# Patient Record
Sex: MALE | Race: WHITE | Employment: FULL TIME | URBAN - METROPOLITAN AREA
[De-identification: names, ages, dates, MRNs, and addresses within clinical notes are randomized per-mention and may not be internally consistent; named-entity substitution may affect disease eponyms.]

---

## 2022-09-29 ENCOUNTER — OFFICE VISIT (OUTPATIENT)
Dept: OCCUPATIONAL MEDICINE | Age: 22
End: 2022-09-29
Attending: FAMILY MEDICINE

## 2022-09-29 DIAGNOSIS — Z00.00 ROUTINE GENERAL MEDICAL EXAMINATION AT A HEALTH CARE FACILITY: Primary | ICD-10-CM

## 2022-09-29 PROCEDURE — 86480 TB TEST CELL IMMUN MEASURE: CPT

## 2022-10-03 LAB
M TB IFN-G CD4+ T-CELLS BLD-ACNC: 0.01 IU/ML
M TB TUBERC IFN-G BLD QL: NEGATIVE
M TB TUBERC IGNF/MITOGEN IGNF CONTROL: >10 IU/ML
QFT TB1 AG MINUS NIL: 0 IU/ML
QFT TB2 AG MINUS NIL: 0 IU/ML

## 2024-04-24 ENCOUNTER — OFFICE VISIT (OUTPATIENT)
Dept: OTHER | Facility: HOSPITAL | Age: 24
End: 2024-04-24
Attending: EMERGENCY MEDICINE
Payer: COMMERCIAL

## 2024-04-24 DIAGNOSIS — Z02.1 PHYSICAL EXAM, PRE-EMPLOYMENT: Primary | ICD-10-CM

## 2024-04-24 DIAGNOSIS — Z00.00 ROUTINE GENERAL MEDICAL EXAMINATION AT A HEALTH CARE FACILITY: Primary | ICD-10-CM

## 2024-04-24 LAB
ALBUMIN SERPL-MCNC: 5 G/DL (ref 3.2–4.8)
ALBUMIN/GLOB SERPL: 1.6 {RATIO} (ref 1–2)
ALP LIVER SERPL-CCNC: 63 U/L
ALT SERPL-CCNC: 16 U/L
ANION GAP SERPL CALC-SCNC: 8 MMOL/L (ref 0–18)
AST SERPL-CCNC: 24 U/L (ref ?–34)
ATRIAL RATE: 46 BPM
BASOPHILS # BLD AUTO: 0.03 X10(3) UL (ref 0–0.2)
BASOPHILS NFR BLD AUTO: 0.4 %
BILIRUB SERPL-MCNC: 0.9 MG/DL (ref 0.3–1.2)
BILIRUB UR QL: NEGATIVE
BUN BLD-MCNC: 17 MG/DL (ref 9–23)
BUN/CREAT SERPL: 15.3 (ref 10–20)
CALCIUM BLD-MCNC: 10.2 MG/DL (ref 8.7–10.4)
CHLORIDE SERPL-SCNC: 104 MMOL/L (ref 98–112)
CHOLEST SERPL-MCNC: 277 MG/DL (ref ?–200)
CLARITY UR: CLEAR
CO2 SERPL-SCNC: 26 MMOL/L (ref 21–32)
COLOR UR: YELLOW
CREAT BLD-MCNC: 1.11 MG/DL
DEPRECATED RDW RBC AUTO: 38 FL (ref 35.1–46.3)
EGFRCR SERPLBLD CKD-EPI 2021: 96 ML/MIN/1.73M2 (ref 60–?)
EOSINOPHIL # BLD AUTO: 0.08 X10(3) UL (ref 0–0.7)
EOSINOPHIL NFR BLD AUTO: 1.1 %
ERYTHROCYTE [DISTWIDTH] IN BLOOD BY AUTOMATED COUNT: 12.4 % (ref 11–15)
FASTING PATIENT LIPID ANSWER: YES
FASTING STATUS PATIENT QL REPORTED: YES
GLOBULIN PLAS-MCNC: 3.2 G/DL (ref 2.8–4.4)
GLUCOSE BLD-MCNC: 84 MG/DL (ref 70–99)
GLUCOSE UR-MCNC: NORMAL MG/DL
HBV SURFACE AB SER QL: REACTIVE
HBV SURFACE AB SERPL IA-ACNC: 24.71 MIU/ML
HCT VFR BLD AUTO: 47.9 %
HDLC SERPL-MCNC: 47 MG/DL (ref 40–59)
HGB BLD-MCNC: 16.8 G/DL
HGB UR QL STRIP.AUTO: NEGATIVE
IMM GRANULOCYTES # BLD AUTO: 0.03 X10(3) UL (ref 0–1)
IMM GRANULOCYTES NFR BLD: 0.4 %
KETONES UR-MCNC: 20 MG/DL
LDLC SERPL CALC-MCNC: 203 MG/DL (ref ?–100)
LEUKOCYTE ESTERASE UR QL STRIP.AUTO: NEGATIVE
LYMPHOCYTES # BLD AUTO: 2.72 X10(3) UL (ref 1–4)
LYMPHOCYTES NFR BLD AUTO: 38.5 %
MCH RBC QN AUTO: 29.5 PG (ref 26–34)
MCHC RBC AUTO-ENTMCNC: 35.1 G/DL (ref 31–37)
MCV RBC AUTO: 84 FL
MONOCYTES # BLD AUTO: 0.64 X10(3) UL (ref 0.1–1)
MONOCYTES NFR BLD AUTO: 9.1 %
NEUTROPHILS # BLD AUTO: 3.57 X10 (3) UL (ref 1.5–7.7)
NEUTROPHILS # BLD AUTO: 3.57 X10(3) UL (ref 1.5–7.7)
NEUTROPHILS NFR BLD AUTO: 50.5 %
NITRITE UR QL STRIP.AUTO: NEGATIVE
NONHDLC SERPL-MCNC: 230 MG/DL (ref ?–130)
OSMOLALITY SERPL CALC.SUM OF ELEC: 287 MOSM/KG (ref 275–295)
P AXIS: 6 DEGREES
P-R INTERVAL: 174 MS
PH UR: 6 [PH] (ref 5–8)
PLATELET # BLD AUTO: 263 10(3)UL (ref 150–450)
POTASSIUM SERPL-SCNC: 4.3 MMOL/L (ref 3.5–5.1)
PROT SERPL-MCNC: 8.2 G/DL (ref 5.7–8.2)
PROT UR-MCNC: NEGATIVE MG/DL
Q-T INTERVAL: 480 MS
QRS DURATION: 118 MS
QTC CALCULATION (BEZET): 420 MS
R AXIS: 79 DEGREES
RBC # BLD AUTO: 5.7 X10(6)UL
RUBV IGG SER QL: POSITIVE
RUBV IGG SER-ACNC: 12.1 IU/ML (ref 10–?)
SODIUM SERPL-SCNC: 138 MMOL/L (ref 136–145)
SP GR UR STRIP: 1.03 (ref 1–1.03)
T AXIS: -11 DEGREES
TRIGL SERPL-MCNC: 148 MG/DL (ref 30–149)
UROBILINOGEN UR STRIP-ACNC: NORMAL
VENTRICULAR RATE: 46 BPM
VLDLC SERPL CALC-MCNC: 32 MG/DL (ref 0–30)
WBC # BLD AUTO: 7.1 X10(3) UL (ref 4–11)

## 2024-04-24 PROCEDURE — 83825 ASSAY OF MERCURY: CPT

## 2024-04-24 PROCEDURE — 80061 LIPID PANEL: CPT

## 2024-04-24 PROCEDURE — 80053 COMPREHEN METABOLIC PANEL: CPT

## 2024-04-24 PROCEDURE — 82175 ASSAY OF ARSENIC: CPT

## 2024-04-24 PROCEDURE — 86706 HEP B SURFACE ANTIBODY: CPT

## 2024-04-24 PROCEDURE — 81003 URINALYSIS AUTO W/O SCOPE: CPT

## 2024-04-24 PROCEDURE — 93005 ELECTROCARDIOGRAM TRACING: CPT

## 2024-04-24 PROCEDURE — 86787 VARICELLA-ZOSTER ANTIBODY: CPT

## 2024-04-24 PROCEDURE — 86480 TB TEST CELL IMMUN MEASURE: CPT

## 2024-04-24 PROCEDURE — 86762 RUBELLA ANTIBODY: CPT

## 2024-04-24 PROCEDURE — 82300 ASSAY OF CADMIUM: CPT

## 2024-04-24 PROCEDURE — 93010 ELECTROCARDIOGRAM REPORT: CPT | Performed by: INTERNAL MEDICINE

## 2024-04-24 PROCEDURE — 83655 ASSAY OF LEAD: CPT

## 2024-04-24 PROCEDURE — 85025 COMPLETE CBC W/AUTO DIFF WBC: CPT

## 2024-04-25 LAB
M TB IFN-G CD4+ T-CELLS BLD-ACNC: 0.04 IU/ML
M TB TUBERC IFN-G BLD QL: NEGATIVE
M TB TUBERC IGNF/MITOGEN IGNF CONTROL: >10 IU/ML
QFT TB1 AG MINUS NIL: 0 IU/ML
QFT TB2 AG MINUS NIL: 0 IU/ML

## 2024-04-26 LAB — VZV IGG SER IA-ACNC: 80.91 (ref 165–?)

## 2024-04-27 LAB
ARSENIC BLOOD: 2 UG/L
CADMIUM, BLOOD: <0.5 UG/L
LEAD BLOOD: <1 UG/DL
MERCURY BLOOD: <1 UG/L

## 2024-04-29 ENCOUNTER — HOSPITAL ENCOUNTER (OUTPATIENT)
Dept: CV DIAGNOSTICS | Facility: HOSPITAL | Age: 24
Discharge: HOME | End: 2024-04-29
Attending: EMERGENCY MEDICINE

## 2024-04-29 DIAGNOSIS — Z00.00 ROUTINE GENERAL MEDICAL EXAMINATION AT A HEALTH CARE FACILITY: ICD-10-CM

## 2024-04-29 PROCEDURE — 93016 CV STRESS TEST SUPVJ ONLY: CPT | Performed by: INTERNAL MEDICINE

## 2024-04-29 PROCEDURE — 93018 CV STRESS TEST I&R ONLY: CPT | Performed by: INTERNAL MEDICINE

## 2024-05-01 ENCOUNTER — OFFICE VISIT (OUTPATIENT)
Dept: OTHER | Facility: HOSPITAL | Age: 24
End: 2024-05-01
Attending: PREVENTIVE MEDICINE

## 2024-05-01 ENCOUNTER — HOSPITAL ENCOUNTER (OUTPATIENT)
Dept: GENERAL RADIOLOGY | Facility: HOSPITAL | Age: 24
Discharge: HOME | End: 2024-05-01
Attending: PREVENTIVE MEDICINE

## 2024-05-01 ENCOUNTER — HOSPITAL ENCOUNTER (OUTPATIENT)
Dept: CV DIAGNOSTICS | Facility: HOSPITAL | Age: 24
End: 2024-05-01
Attending: PREVENTIVE MEDICINE

## 2024-05-01 DIAGNOSIS — Z02.1 PRE-EMPLOYMENT EXAMINATION: ICD-10-CM

## 2024-05-01 DIAGNOSIS — Z02.1 PRE-EMPLOYMENT EXAMINATION: Primary | ICD-10-CM

## 2024-05-01 LAB
% OF MAX PREDICTED HR: 100 %
ALBUMIN SERPL-MCNC: 4.1 G/DL (ref 3.4–5)
ALP LIVER SERPL-CCNC: 62 U/L
ALT SERPL-CCNC: 26 U/L
AST SERPL-CCNC: 28 U/L (ref 15–37)
BASOPHILS # BLD AUTO: 0.02 X10(3) UL (ref 0–0.2)
BASOPHILS NFR BLD AUTO: 0.3 %
BILIRUB SERPL-MCNC: 0.6 MG/DL (ref 0.1–2)
BILIRUB UR QL STRIP.AUTO: NEGATIVE
BUN BLD-MCNC: 17 MG/DL (ref 9–23)
CALCIUM BLD-MCNC: 9.3 MG/DL (ref 8.5–10.1)
CHLORIDE SERPL-SCNC: 102 MMOL/L (ref 98–112)
CHOLEST SERPL-MCNC: 306 MG/DL (ref ?–200)
CLARITY UR REFRACT.AUTO: CLEAR
CO2 SERPL-SCNC: 24 MMOL/L (ref 21–32)
CREAT BLD-MCNC: 1.13 MG/DL
EGFRCR SERPLBLD CKD-EPI 2021: 94 ML/MIN/1.73M2 (ref 60–?)
EOSINOPHIL # BLD AUTO: 0.04 X10(3) UL (ref 0–0.7)
EOSINOPHIL NFR BLD AUTO: 0.7 %
ERYTHROCYTE [DISTWIDTH] IN BLOOD BY AUTOMATED COUNT: 12.5 %
GGT SERPL-CCNC: 23 U/L
GLUCOSE BLD-MCNC: 90 MG/DL (ref 70–99)
GLUCOSE UR STRIP.AUTO-MCNC: NORMAL MG/DL
HCT VFR BLD AUTO: 46.1 %
HDLC SERPL-MCNC: 40 MG/DL (ref 40–59)
HGB BLD-MCNC: 16.4 G/DL
IMM GRANULOCYTES # BLD AUTO: 0.01 X10(3) UL (ref 0–1)
IMM GRANULOCYTES NFR BLD: 0.2 %
IRON SERPL-MCNC: 94 UG/DL
KETONES UR STRIP.AUTO-MCNC: 40 MG/DL
LDH SERPL L TO P-CCNC: 199 U/L
LDLC SERPL CALC-MCNC: 236 MG/DL (ref ?–100)
LEUKOCYTE ESTERASE UR QL STRIP.AUTO: NEGATIVE
LYMPHOCYTES # BLD AUTO: 2.3 X10(3) UL (ref 1–4)
LYMPHOCYTES NFR BLD AUTO: 38.5 %
MAGNESIUM SERPL-MCNC: 2.3 MG/DL (ref 1.6–2.6)
MAX DIASTOLIC BP: 73 MMHG
MAX HEART RATE: 184 BPM
MAX PREDICTED HEART RATE: 197 BPM
MAX SYSTOLIC BP: 165 MMHG
MAX WORK LOAD: 146
MCH RBC QN AUTO: 29.3 PG (ref 26–34)
MCHC RBC AUTO-ENTMCNC: 35.6 G/DL (ref 31–37)
MCV RBC AUTO: 82.3 FL
MONOCYTES # BLD AUTO: 0.6 X10(3) UL (ref 0.1–1)
MONOCYTES NFR BLD AUTO: 10 %
NEUTROPHILS # BLD AUTO: 3.01 X10 (3) UL (ref 1.5–7.7)
NEUTROPHILS # BLD AUTO: 3.01 X10(3) UL (ref 1.5–7.7)
NEUTROPHILS NFR BLD AUTO: 50.3 %
NITRITE UR QL STRIP.AUTO: NEGATIVE
NONHDLC SERPL-MCNC: 266 MG/DL (ref ?–130)
PH UR STRIP.AUTO: 6.5 [PH] (ref 5–8)
PHOSPHATE SERPL-MCNC: 3 MG/DL (ref 2.5–4.9)
PLATELET # BLD AUTO: 238 10(3)UL (ref 150–450)
POTASSIUM SERPL-SCNC: 4.1 MMOL/L (ref 3.5–5.1)
PROT SERPL-MCNC: 8.1 G/DL (ref 6.4–8.2)
PROT UR STRIP.AUTO-MCNC: NEGATIVE MG/DL
RBC # BLD AUTO: 5.6 X10(6)UL
RBC UR QL AUTO: NEGATIVE
SODIUM SERPL-SCNC: 134 MMOL/L (ref 136–145)
SP GR UR STRIP.AUTO: 1.01 (ref 1–1.03)
TRIGL SERPL-MCNC: 156 MG/DL (ref 30–149)
URATE SERPL-MCNC: 8.4 MG/DL
UROBILINOGEN UR STRIP.AUTO-MCNC: NORMAL MG/DL
VLDLC SERPL CALC-MCNC: 36 MG/DL (ref 0–30)
WBC # BLD AUTO: 6 X10(3) UL (ref 4–11)

## 2024-05-01 PROCEDURE — 81003 URINALYSIS AUTO W/O SCOPE: CPT

## 2024-05-01 PROCEDURE — 85025 COMPLETE CBC W/AUTO DIFF WBC: CPT

## 2024-05-01 PROCEDURE — 80061 LIPID PANEL: CPT

## 2024-05-01 PROCEDURE — 80053 COMPREHEN METABOLIC PANEL: CPT

## 2024-07-17 ENCOUNTER — HOSPITAL ENCOUNTER (OUTPATIENT)
Facility: HOSPITAL | Age: 24
Setting detail: OBSERVATION
Discharge: HOME | End: 2024-07-18
Attending: EMERGENCY MEDICINE | Admitting: STUDENT IN AN ORGANIZED HEALTH CARE EDUCATION/TRAINING PROGRAM
Payer: OTHER MISCELLANEOUS

## 2024-07-17 DIAGNOSIS — T79.6XXA TRAUMATIC RHABDOMYOLYSIS, INITIAL ENCOUNTER (HCC): ICD-10-CM

## 2024-07-17 DIAGNOSIS — E87.1 HYPONATREMIA: Primary | ICD-10-CM

## 2024-07-17 DIAGNOSIS — N18.32 STAGE 3B CHRONIC KIDNEY DISEASE (HCC): ICD-10-CM

## 2024-07-17 DIAGNOSIS — E86.0 DEHYDRATION: ICD-10-CM

## 2024-07-17 DIAGNOSIS — L30.1 DYSHYDROSIS: ICD-10-CM

## 2024-07-17 LAB
ALBUMIN SERPL-MCNC: 5.3 G/DL (ref 3.2–4.8)
ALBUMIN/GLOB SERPL: 1.5 {RATIO} (ref 1–2)
ALP LIVER SERPL-CCNC: 68 U/L
ALT SERPL-CCNC: 45 U/L
ANION GAP SERPL CALC-SCNC: 16 MMOL/L (ref 0–18)
AST SERPL-CCNC: 39 U/L (ref ?–34)
BASOPHILS # BLD AUTO: 0.07 X10(3) UL (ref 0–0.2)
BASOPHILS NFR BLD AUTO: 0.4 %
BILIRUB SERPL-MCNC: 1.3 MG/DL (ref 0.3–1.2)
BUN BLD-MCNC: 25 MG/DL (ref 9–23)
CALCIUM BLD-MCNC: 10.9 MG/DL (ref 8.7–10.4)
CHLORIDE SERPL-SCNC: 91 MMOL/L (ref 98–112)
CK SERPL-CCNC: 1285 U/L
CO2 SERPL-SCNC: 20 MMOL/L (ref 21–32)
CREAT BLD-MCNC: 2.69 MG/DL
EGFRCR SERPLBLD CKD-EPI 2021: 33 ML/MIN/1.73M2 (ref 60–?)
EOSINOPHIL # BLD AUTO: 0 X10(3) UL (ref 0–0.7)
EOSINOPHIL NFR BLD AUTO: 0 %
ERYTHROCYTE [DISTWIDTH] IN BLOOD BY AUTOMATED COUNT: 12.3 %
GLOBULIN PLAS-MCNC: 3.5 G/DL (ref 2.8–4.4)
GLUCOSE BLD-MCNC: 83 MG/DL (ref 70–99)
HCT VFR BLD AUTO: 44.8 %
HGB BLD-MCNC: 16 G/DL
IMM GRANULOCYTES # BLD AUTO: 0.1 X10(3) UL (ref 0–1)
IMM GRANULOCYTES NFR BLD: 0.6 %
LYMPHOCYTES # BLD AUTO: 2.78 X10(3) UL (ref 1–4)
LYMPHOCYTES NFR BLD AUTO: 17.3 %
MCH RBC QN AUTO: 29.1 PG (ref 26–34)
MCHC RBC AUTO-ENTMCNC: 35.7 G/DL (ref 31–37)
MCV RBC AUTO: 81.6 FL
MONOCYTES # BLD AUTO: 1.47 X10(3) UL (ref 0.1–1)
MONOCYTES NFR BLD AUTO: 9.1 %
NEUTROPHILS # BLD AUTO: 11.69 X10 (3) UL (ref 1.5–7.7)
NEUTROPHILS # BLD AUTO: 11.69 X10(3) UL (ref 1.5–7.7)
NEUTROPHILS NFR BLD AUTO: 72.6 %
OSMOLALITY SERPL CALC.SUM OF ELEC: 268 MOSM/KG (ref 275–295)
PLATELET # BLD AUTO: 326 10(3)UL (ref 150–450)
POTASSIUM SERPL-SCNC: 3.9 MMOL/L (ref 3.5–5.1)
PROT SERPL-MCNC: 8.8 G/DL (ref 5.7–8.2)
RBC # BLD AUTO: 5.49 X10(6)UL
SODIUM SERPL-SCNC: 127 MMOL/L (ref 136–145)
WBC # BLD AUTO: 16.1 X10(3) UL (ref 4–11)

## 2024-07-17 PROCEDURE — 99222 1ST HOSP IP/OBS MODERATE 55: CPT | Performed by: STUDENT IN AN ORGANIZED HEALTH CARE EDUCATION/TRAINING PROGRAM

## 2024-07-17 RX ORDER — TRAMADOL HYDROCHLORIDE 50 MG/1
50 TABLET ORAL EVERY 8 HOURS PRN
Status: DISCONTINUED | OUTPATIENT
Start: 2024-07-17 | End: 2024-07-18

## 2024-07-17 RX ORDER — PROCHLORPERAZINE EDISYLATE 5 MG/ML
5 INJECTION INTRAMUSCULAR; INTRAVENOUS EVERY 8 HOURS PRN
Status: DISCONTINUED | OUTPATIENT
Start: 2024-07-17 | End: 2024-07-18

## 2024-07-17 RX ORDER — POLYETHYLENE GLYCOL 3350 17 G/17G
17 POWDER, FOR SOLUTION ORAL DAILY PRN
Status: DISCONTINUED | OUTPATIENT
Start: 2024-07-17 | End: 2024-07-18

## 2024-07-17 RX ORDER — HEPARIN SODIUM 5000 [USP'U]/ML
5000 INJECTION, SOLUTION INTRAVENOUS; SUBCUTANEOUS EVERY 8 HOURS SCHEDULED
Status: DISCONTINUED | OUTPATIENT
Start: 2024-07-17 | End: 2024-07-18

## 2024-07-17 RX ORDER — ACETAMINOPHEN 500 MG
1000 TABLET ORAL EVERY 8 HOURS PRN
Status: DISCONTINUED | OUTPATIENT
Start: 2024-07-17 | End: 2024-07-18

## 2024-07-17 RX ORDER — SODIUM CHLORIDE 9 MG/ML
INJECTION, SOLUTION INTRAVENOUS CONTINUOUS
Status: ACTIVE | OUTPATIENT
Start: 2024-07-17 | End: 2024-07-17

## 2024-07-17 RX ORDER — SODIUM CHLORIDE, SODIUM LACTATE, POTASSIUM CHLORIDE, CALCIUM CHLORIDE 600; 310; 30; 20 MG/100ML; MG/100ML; MG/100ML; MG/100ML
INJECTION, SOLUTION INTRAVENOUS CONTINUOUS
Status: DISCONTINUED | OUTPATIENT
Start: 2024-07-17 | End: 2024-07-18

## 2024-07-17 RX ORDER — TRIAMCINOLONE ACETONIDE 1 MG/G
CREAM TOPICAL ONCE
Status: COMPLETED | OUTPATIENT
Start: 2024-07-17 | End: 2024-07-17

## 2024-07-17 RX ORDER — ONDANSETRON 2 MG/ML
4 INJECTION INTRAMUSCULAR; INTRAVENOUS EVERY 6 HOURS PRN
Status: DISCONTINUED | OUTPATIENT
Start: 2024-07-17 | End: 2024-07-18

## 2024-07-17 RX ORDER — ENEMA 19; 7 G/133ML; G/133ML
1 ENEMA RECTAL ONCE AS NEEDED
Status: DISCONTINUED | OUTPATIENT
Start: 2024-07-17 | End: 2024-07-18

## 2024-07-17 RX ORDER — SENNOSIDES 8.6 MG
17.2 TABLET ORAL NIGHTLY PRN
Status: DISCONTINUED | OUTPATIENT
Start: 2024-07-17 | End: 2024-07-18

## 2024-07-17 RX ORDER — BENZONATATE 100 MG/1
200 CAPSULE ORAL 3 TIMES DAILY PRN
Status: DISCONTINUED | OUTPATIENT
Start: 2024-07-17 | End: 2024-07-18

## 2024-07-17 RX ORDER — TRIAMCINOLONE ACETONIDE 1 MG/G
CREAM TOPICAL 2 TIMES DAILY
Qty: 1 EACH | Refills: 0 | Status: SHIPPED | OUTPATIENT
Start: 2024-07-17

## 2024-07-17 RX ORDER — BISACODYL 10 MG
10 SUPPOSITORY, RECTAL RECTAL
Status: DISCONTINUED | OUTPATIENT
Start: 2024-07-17 | End: 2024-07-18

## 2024-07-17 RX ORDER — ECHINACEA PURPUREA EXTRACT 125 MG
1 TABLET ORAL
Status: DISCONTINUED | OUTPATIENT
Start: 2024-07-17 | End: 2024-07-18

## 2024-07-17 NOTE — ED PROVIDER NOTES
Patient Seen in: [facility] Emergency Department      History     Chief Complaint   Patient presents with    Dehydration     Stated Complaint: dehydration    Subjective:     HPI    24-year-old male who is usually healthy and is in the [employer] program.  He was engaged in physical activity, specifically pulling hoses, for an extended period of approximately 8 hours. This activity was part of a [employer] training that began the previous Monday. The training involved several strenuous activities throughout the week, with a break on Monday due to high temperatures. Despite the break, the individual continued with the training, which included wearing gear for extended periods. During the day of the incident, the individual ensured adequate hydration, consuming water and liquid IV during lunch for electrolyte replenishment. Additionally, a couple of Gatorades were consumed after the activity around 4:15 PM. However, following the day's activities, the individual began experiencing cramps that started in the legs and gradually spread to other parts of the body. The cramps were circumferential and persisted despite attempts to alleviate them through rest and muscle rolling for about 30 minutes. Accompanying the cramps was a sensation of shaking, which the individual was unsure if it was due to muscle fatigue.    Objective:   Past Medical History:    Encounter for general adult medical examination without abnormal findings              Past Surgical History:   Procedure Laterality Date    [place] teeth removed                  No pertinent social history.            Review of Systems    Positive for stated complaint: dehydration  Other systems are as noted in HPI.  Constitutional and vital signs reviewed.      All other systems reviewed and negative except as noted above.    Physical Exam     ED Triage Vitals [07/17/24 1724]   BP (!) 137/96   Pulse 85   Resp 18   Temp 97.5 °F (36.4 °C)   Temp src Temporal   SpO2  100 %   O2 Device None (Room air)       Current:/60 (BP Location: Left arm)   Pulse 86   Temp 98.1 °F (36.7 °C) (Oral)   Resp 18   Ht 172.7 cm (5' 8\")   Wt 90.7 kg   SpO2 96%   BMI 30.41 kg/m²         General:  Vitals as listed.  No acute distress   HEENT: Sclerae anicteric.  Conjunctivae show no pallor.  Oropharynx clear, mucous membranes moist   Lungs: good air exchange and clear   Heart: regular rate rhythm and no murmur   Abdomen: Soft and nontender.  No abdominal masses.  No peritoneal signs   Extremities: no edema, normal peripheral pulses.  Patient has erythema of his hands that he attributes to sweating excessively under his gloves while working.  I suspect he has dyshidrotic eczema  Neuro: Alert oriented and nonfocal      ED Course     Labs Reviewed   COMP METABOLIC PANEL (14) - Abnormal; Notable for the following components:       Result Value    Sodium 127 (*)     Chloride 91 (*)     CO2 20.0 (*)     BUN 25 (*)     Creatinine 2.69 (*)     Calcium, Total 10.9 (*)     Calculated Osmolality 268 (*)     eGFR-Cr 33 (*)     AST 39 (*)     Bilirubin, Total 1.3 (*)     Total Protein 8.8 (*)     Albumin 5.3 (*)     All other components within normal limits   CK CREATINE KINASE (NOT CREATININE) - Abnormal; Notable for the following components:    CK 1,285 (*)     All other components within normal limits   CBC W/ DIFFERENTIAL - Abnormal; Notable for the following components:    WBC 16.1 (*)     Neutrophil Absolute Prelim 11.69 (*)     Neutrophil Absolute 11.69 (*)     Monocyte Absolute 1.47 (*)     All other components within normal limits   CBC WITH DIFFERENTIAL WITH PLATELET    Narrative:     The following orders were created for panel order CBC With Differential With Platelet.  Procedure                               Abnormality         Status                     ---------                               -----------         ------                     CBC W/ DIFFERENTIAL[[id number]]          Abnormal             Final result                 Please view results for these tests on the individual orders.   RAINBOW DRAW LAVENDER   RAINBOW DRAW LIGHT GREEN   RAINBOW DRAW BLUE   RAINBOW DRAW GOLD     EKG    Rate, intervals and axes as noted on EKG Report.  Rate: 73  Rhythm: Sinus Rhythm  Reading: No acute ST-T changes           ED COURSE and MDM     Sources of the medical history included the patient and his mother who accompanies him    Hydrated with normal saline    Given triamcinolone cream for his hands.    Case discussed with Dr. Zuluaga, [facility] Hospitalist.    Patient has mild rhabdomyolysis.  He has an elevated creatinine that is a lot more likely due to dehydration but can be complication of his rhabdomyolysis.  His sodium is 127.    I have discussed with the patient the results of testing, differential diagnosis, and treatment plan. They expressed clear understanding of these instructions and agrees to the plan provided.    Disposition and Plan     Clinical Impression:  1. Hyponatremia    2. Dehydration    3. Traumatic rhabdomyolysis, initial encounter (HCC)    4. Stage 3b chronic kidney disease (HCC)    5. Dyshydrosis         Disposition:  Admit  7/17/2024  8:37 pm    Follow-up:  Kenyon Kim MD  [address] [place] [address]  [phone]    Schedule an appointment as soon as possible for a visit in 3 day(s)          Medications Prescribed:  Current Discharge Medication List        START taking these medications    Details   triamcinolone 0.1 % External Cream Apply topically 2 (two) times daily.  Qty: 1 each, Refills: 0

## 2024-07-17 NOTE — ED INITIAL ASSESSMENT (HPI)
BIBA from [facility]. 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 Pt also c/o dehydration.

## 2024-07-18 VITALS
BODY MASS INDEX: 30.31 KG/M2 | HEART RATE: 54 BPM | WEIGHT: 200 LBS | HEIGHT: 68 IN | DIASTOLIC BLOOD PRESSURE: 54 MMHG | SYSTOLIC BLOOD PRESSURE: 109 MMHG | RESPIRATION RATE: 17 BRPM | OXYGEN SATURATION: 96 % | TEMPERATURE: 98 F

## 2024-07-18 LAB
ANION GAP SERPL CALC-SCNC: 6 MMOL/L (ref 0–18)
ATRIAL RATE: 73 BPM
BASOPHILS # BLD AUTO: 0.03 X10(3) UL (ref 0–0.2)
BASOPHILS NFR BLD AUTO: 0.3 %
BUN BLD-MCNC: 19 MG/DL (ref 9–23)
CALCIUM BLD-MCNC: 8.7 MG/DL (ref 8.7–10.4)
CHLORIDE SERPL-SCNC: 106 MMOL/L (ref 98–112)
CK SERPL-CCNC: 1384 U/L
CO2 SERPL-SCNC: 26 MMOL/L (ref 21–32)
CREAT BLD-MCNC: 1.04 MG/DL
EGFRCR SERPLBLD CKD-EPI 2021: 103 ML/MIN/1.73M2 (ref 60–?)
EOSINOPHIL # BLD AUTO: 0.1 X10(3) UL (ref 0–0.7)
EOSINOPHIL NFR BLD AUTO: 1.1 %
ERYTHROCYTE [DISTWIDTH] IN BLOOD BY AUTOMATED COUNT: 12.6 %
GLUCOSE BLD-MCNC: 101 MG/DL (ref 70–99)
HCT VFR BLD AUTO: 38.3 %
HGB BLD-MCNC: 13.5 G/DL
IMM GRANULOCYTES # BLD AUTO: 0.04 X10(3) UL (ref 0–1)
IMM GRANULOCYTES NFR BLD: 0.4 %
LYMPHOCYTES # BLD AUTO: 3.53 X10(3) UL (ref 1–4)
LYMPHOCYTES NFR BLD AUTO: 38.2 %
MAGNESIUM SERPL-MCNC: 1.9 MG/DL (ref 1.6–2.6)
MCH RBC QN AUTO: 29 PG (ref 26–34)
MCHC RBC AUTO-ENTMCNC: 35.2 G/DL (ref 31–37)
MCV RBC AUTO: 82.4 FL
MONOCYTES # BLD AUTO: 1.2 X10(3) UL (ref 0.1–1)
MONOCYTES NFR BLD AUTO: 13 %
NEUTROPHILS # BLD AUTO: 4.33 X10 (3) UL (ref 1.5–7.7)
NEUTROPHILS # BLD AUTO: 4.33 X10(3) UL (ref 1.5–7.7)
NEUTROPHILS NFR BLD AUTO: 47 %
OSMOLALITY SERPL CALC.SUM OF ELEC: 288 MOSM/KG (ref 275–295)
P AXIS: 39 DEGREES
P-R INTERVAL: 152 MS
PHOSPHATE SERPL-MCNC: 3.8 MG/DL (ref 2.4–5.1)
PLATELET # BLD AUTO: 252 10(3)UL (ref 150–450)
POTASSIUM SERPL-SCNC: 4.1 MMOL/L (ref 3.5–5.1)
Q-T INTERVAL: 378 MS
QRS DURATION: 108 MS
QTC CALCULATION (BEZET): 416 MS
R AXIS: 94 DEGREES
RBC # BLD AUTO: 4.65 X10(6)UL
SODIUM SERPL-SCNC: 138 MMOL/L (ref 136–145)
T AXIS: 15 DEGREES
VENTRICULAR RATE: 73 BPM
WBC # BLD AUTO: 9.2 X10(3) UL (ref 4–11)

## 2024-07-18 PROCEDURE — 99239 HOSP IP/OBS DSCHRG MGMT >30: CPT | Performed by: HOSPITALIST

## 2024-07-18 NOTE — PLAN OF CARE
Patient alert and oriented x4. Room air. Continuous pulse oximeter. Non-cardiac electrolyte protocol. Last bowel movement 7/17. Renal diet. Labs completed per orders. Deemed appropriate for discharge. Return to work form for [facility] completed and provided to patient. Patient discharged home.

## 2024-07-18 NOTE — PLAN OF CARE
Pt A&Ox4, VSS on RA. Pt denies pain at this time. New IV placed per pt request. Up-ad sky. IVF infusing per order. Plan to continue IVF, trend labs and monitor urine output. DC when medically cleared. Call light in reach, safety measures in place.

## 2024-07-18 NOTE — H&P
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 Pt has been drinking ~ 7 L of fluid per day including gatorade and regular water. The patient comes to the hospital with severe LE cramping, malaise.     History/Other:    Past Medical History:  Past Medical History:    Encounter for general adult medical examination without abnormal findings     Past Surgical History:   Past Surgical History:   Procedure Laterality Date    [place] teeth removed        Family History:   Family History   Problem Relation Age of Onset    Other (Other) Mother         hyperlipidemia    Other (Other) Father         hyperlipidemia    No Known Problems Sister      Social History:    reports that he has never smoked. He has never used smokeless tobacco. He reports current alcohol use. He reports that he does not currently use drugs.     Allergies: No Known Allergies    Medications:    No current facility-administered medications on file prior to encounter.     No current outpatient medications on file prior to encounter.       Review of Systems:   A comprehensive review of systems was completed.    Pertinent positives and negatives noted in the HPI.    Objective:   Physical Exam:    /80   Pulse 80   Temp 97.5 °F (36.4 °C) (Temporal)   Resp 17   Ht 5' 8\" (1.727 m)   Wt 200 lb (90.7 kg)   SpO2 100%   BMI 30.41 kg/m²   General: No acute distress, Alert  Respiratory: No rhonchi, no wheezes  Cardiovascular: S1, S2. Regular rate and rhythm  Abdomen: Soft, Non-tender, non-distended, positive bowel sounds  Neuro: No new focal deficits  Extremities: No edema      Results:    Labs:      Labs Last 24  Hours:    Recent Labs   Lab 07/17/24  1736   RBC 5.49   HGB 16.0   HCT 44.8   MCV 81.6   MCH 29.1   MCHC 35.7   RDW 12.3   NEPRELIM 11.69*   WBC 16.1*   .0       Recent Labs   Lab 07/17/24  1736   GLU 83   BUN 25*   CREATSERUM 2.69*   EGFRCR 33*   CA 10.9*   ALB 5.3*   *   K 3.9   CL 91*   CO2 20.0*   ALKPHO 68   AST 39*   ALT 45   BILT 1.3*   TP 8.8*       No results found for: \"PT\", \"INR\"    Recent Labs   Lab 07/17/24  1736   CK 1,285*       No results for input(s): \"TROP\", \"PBNP\" in the last 168 hours.    No results for input(s): \"PCT\" in the last 168 hours.    Imaging: Imaging data reviewed in Epic.    Assessment & Plan:      #Traumatic rhabdomyolysis to heat exhaustion/ exertion  IVF  Trend CK  Repeat LFT, e- panel in AM  #ANUPAMA due to rhabdo, dehydration  IVF  Monitor U/O  Renally dose meds  # elevated LFT supsect due to above however Monitor T Bili  #HyperCa  3HypoNA- prerenal      Plan of care discussed with pt , pt family    Alyson Zuluaga MD    Supplementary Documentation:     The 21st Century Cures Act makes medical notes like these available to patients in the interest of transparency. Please be advised this is a medical document. Medical documents are intended to carry relevant information, facts as evident, and the clinical opinion of the practitioner. The medical note is intended as peer to peer communication and may appear blunt or direct. It is written in medical language and may contain abbreviations or verbiage that are unfamiliar.

## 2024-07-18 NOTE — ED QUICK NOTES
Orders for admission, patient is aware of plan and ready to go upstairs. Any questions, please call ED RN meredith at extension [phone].     Patient Covid vaccination status: Unvaccinated     COVID Test Ordered in ED: None    COVID Suspicion at Admission: N/A    Running Infusions:      Mental Status/LOC at time of transport: A&Ox4    Other pertinent information: ambulatory  CIWA score: N/A   NIH score:  N/A

## 2024-07-18 NOTE — PROGRESS NOTES
AVS reviewed, IV dc'd, verbalized understanding of dc instructions, will follow-up w/ PCP as directed.

## 2024-07-19 ENCOUNTER — PATIENT OUTREACH (OUTPATIENT)
Dept: CASE MANAGEMENT | Age: 24
End: 2024-07-19

## 2024-07-19 NOTE — DISCHARGE SUMMARY
[facility]     Dwayne Shane Patient Status:  Observation    6/15/2000 MRN [id number]   Location [facility] 3SW-A Attending No att. 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 Pt has been drinking ~ 7 L of fluid per day including gatorade and regular water. The patient comes to the hospital with severe LE cramping, malaise.     Brief Synopsis:   Patient presents with lower extremity cramping.  Found to have mild rhabdomyolysis with acute kidney injury.  He was admitted and received aggressive fluid hydration.  Patient's ANUPAMA resolved.  Patient was seen continued mild cramping but improved.  We discussed appropriate fluid hydration and solute intake during times of extreme exertion.  Patient encouraged to establish with a PCP for continued follow-up after discharge.    Lace+ Score: 25  59-90 High Risk  29-58 Medium Risk  0-28   Low Risk  Patient was referred to the [facility].    TCM Follow-Up Recommendation:  LACE < 29: Low Risk of readmission after discharge from the hospital; Still recommend for TCM follow-up.      Procedures during hospitalization:   None    Lab/Test results pending at Discharge:   None    Consultants:  None    Discharge Medication List:     Discharge Medications        START taking these medications        Instructions Prescription details   triamcinolone 0.1 % Crea  Commonly known as: Kenalog      Apply topically 2 (two) times daily.   Quantity: 1 each  Refills: 0               Where to Get Your Medications        These medications were sent to [id number] - [place], [place] - [address] AT [place] [address] [place], [phone], [phone]  [address], [place] [address]      Hours: 24-hours Phone: [phone]   triamcinolone 0.1 % Crea         [place]: [place]    Follow-up appointment:   Kenyon Kim MD  [address] [facility] [address]  [place] [address]  [phone]    Schedule an appointment as soon as possible for a visit in 3 day(s)      Pcp, Unknown  [place] [address]          Baldev Rivers MD  [address] [place] [address]  [phone]    Schedule an appointment as soon as possible for a visit  Follow-up if needed.    Appointments for Next 30 Days 2024 - 2024      None            Vital signs:  Temp:  [98 °F (36.7 °C)-98.2 °F (36.8 °C)] 98 °F (36.7 °C)  Pulse:  [54-86] 54  Resp:  [16-18] 17  BP: (109-130)/(46-80) 109/54  SpO2:  [96 %-100 %] 96 %    Physical Exam:    General: No acute distress, pleasant    Lungs: clear to auscultation  Cardiovascular: S1, S2, RRR  Abdomen: Soft, NT, ND    -----------------------------------------------------------------------------------------------  PATIENT DISCHARGE INSTRUCTIONS: See electronic chart    Baldev Rivers MD    Total time spent on discharge plannin minutes     The  Century Cures Act makes medical notes like these available to patients in the interest of transparency. Please be advised this is a medical document. Medical documents are intended to carry relevant information, facts as evident, and the clinical opinion of the practitioner. The medical note is intended as peer to peer communication and may appear blunt or direct. It is written in medical language and may contain abbreviations or verbiage that are unfamiliar.

## 2024-07-19 NOTE — PROGRESS NOTES
LM for pt to call [facility] for TCM since discharge. NCM phone number was provided for pt to call back.    TCC contact information was provided for pt to call back as well.

## 2024-07-19 NOTE — PROGRESS NOTES
NCM attempted to reach the patient to complete a TCM/HFU call. Left message to call back. [facility] provided direct contact info at [phone].

## 2024-07-22 ENCOUNTER — PATIENT OUTREACH (OUTPATIENT)
Dept: CASE MANAGEMENT | Age: 24
End: 2024-07-22

## 2024-07-22 NOTE — PAYOR COMM NOTE
--------------  ADMISSION REVIEW     Payor: [employer]  Subscriber #:  [id number]  Authorization Number: [id number]    Admit date: N/A  Admit time: N/A        07/17/24 2154  Place in observation Once  (Place Observation Medicine)  Once     Completed     Service: Medical  Level of Care: Acute  Patient Class: Observation   Question: Diagnosis Answer: Hyponatremia                 REVIEW DOCUMENTATION:      Patient Seen in: [facility] Emergency Department      History     Chief Complaint   Patient presents with    Dehydration     Stated Complaint: dehydration    Subjective:     HPI    24-year-old male who is usually healthy and is in the [employer] program.  He was engaged in physical activity, specifically pulling hoses, for an extended period of approximately 8 hours. This activity was part of a [employer] training that began the previous Monday. The training involved several strenuous activities throughout the week, with a break on Monday due to high temperatures. Despite the break, the individual continued with the training, which included wearing gear for extended periods. During the day of the incident, the individual ensured adequate hydration, consuming water and liquid IV during lunch for electrolyte replenishment. Additionally, a couple of Gatorades were consumed after the activity around 4:15 PM. However, following the day's activities, the individual began experiencing cramps that started in the legs and gradually spread to other parts of the body. The cramps were circumferential and persisted despite attempts to alleviate them through rest and muscle rolling for about 30 minutes. Accompanying the cramps was a sensation of shaking, which the individual was unsure if it was due to muscle fatigue.      Review of Systems    Positive for stated complaint: dehydration    Physical Exam     ED Triage Vitals [07/17/24 1724]   BP (!) 137/96   Pulse 85   Resp 18   Temp 97.5 °F (36.4 °C)   Temp src Temporal    SpO2 100 %   O2 Device None (Room air)       Current:/60 (BP Location: Left arm)   Pulse 86   Temp 98.1 °F (36.7 °C) (Oral)   Resp 18   Ht 172.7 cm (5' 8\")   Wt 90.7 kg   SpO2 96%   BMI 30.41 kg/m²         General:  Vitals as listed.  No acute distress   HEENT: Sclerae anicteric.  Conjunctivae show no pallor.  Oropharynx clear, mucous membranes moist   Lungs: good air exchange and clear   Heart: regular rate rhythm and no murmur   Abdomen: Soft and nontender.  No abdominal masses.  No peritoneal signs   Extremities: no edema, normal peripheral pulses.  Patient has erythema of his hands that he attributes to sweating excessively under his gloves while working.  I suspect he has dyshidrotic eczema  Neuro: Alert oriented and nonfocal      ED Course     Labs Reviewed   COMP METABOLIC PANEL (14) - Abnormal; Notable for the following components:       Result Value    Sodium 127 (*)     Chloride 91 (*)     CO2 20.0 (*)     BUN 25 (*)     Creatinine 2.69 (*)     Calcium, Total 10.9 (*)     Calculated Osmolality 268 (*)     eGFR-Cr 33 (*)     AST 39 (*)     Bilirubin, Total 1.3 (*)     Total Protein 8.8 (*)     Albumin 5.3 (*)     All other components within normal limits   CK CREATINE KINASE (NOT CREATININE) - Abnormal; Notable for the following components:    CK 1,285 (*)     All other components within normal limits   CBC W/ DIFFERENTIAL - Abnormal; Notable for the following components:    WBC 16.1 (*)     Neutrophil Absolute Prelim 11.69 (*)     Neutrophil Absolute 11.69 (*)     Monocyte Absolute 1.47 (*)     All other components within normal limits   EKG    Rate, intervals and axes as noted on EKG Report.  Rate: 73  Rhythm: Sinus Rhythm  Reading: No acute ST-T changes    ED COURSE and MDM     Sources of the medical history included the patient and his mother who accompanies him  Hydrated with normal saline  Given triamcinolone cream for his hands.  Case discussed with [facility]  hospitalist.    Patient has mild rhabdomyolysis.  He has an elevated creatinine that is a lot more likely due to dehydration but can be complication of his rhabdomyolysis.  His sodium is 127.    I have discussed with the patient the results of testing, differential diagnosis, and treatment plan. They expressed clear understanding of these instructions and agrees to the plan provided.    Disposition and Plan     Clinical Impression:  1. Hyponatremia    2. Dehydration    3. Traumatic rhabdomyolysis, initial encounter (HCC)    4. Stage 3b chronic kidney disease (HCC)    5. 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 Pt has been drinking ~ 7 L of fluid per day including gatorade and regular water. The patient comes to the hospital with severe LE cramping, malaise.     History/Other:    Past Medical History:  Past Medical History:    Encounter for general adult medical examination without abnormal findings     Past Surgical History:   Past Surgical History:   Procedure Laterality Date    [place] teeth removed        Family History:   Family History   Problem Relation Age of Onset    Other (Other) Mother         hyperlipidemia    Other (Other) Father         hyperlipidemia    No Known Problems Sister      Social History:    reports that he has never smoked. He has never used smokeless tobacco. He reports current alcohol use. He reports that he does not currently use drugs.     AReview of Systems:   A comprehensive review of systems was completed.    Pertinent positives and negatives noted in the HPI.    Objective:    Physical Exam:    /80   Pulse 80   Temp 97.5 °F (36.4 °C) (Temporal)   Resp 17   Ht 5' 8\" (1.727 m)   Wt 200 lb (90.7 kg)   SpO2 100%   BMI 30.41 kg/m²   General: No acute distress, Alert  Respiratory: No rhonchi, no wheezes  Cardiovascular: S1, S2. Regular rate and rhythm  Abdomen: Soft, Non-tender, non-distended, positive bowel sounds  Neuro: No new focal deficits  Extremities: No edema      Assessment & Plan:      #Traumatic rhabdomyolysis to heat exhaustion/ exertion  IVF  Trend CK  Repeat LFT, e- panel in AM  #ANUPAMA due to rhabdo, dehydration  IVF  Monitor U/O  Renally dose meds  # elevated LFT supsect due to above however Monitor T Bili  #HyperCa  3HypoNA- prerenal    Alyson Zuluaga MD            Date of Admission: 7/17/2024  Date of Discharge:  7/18/2024      Discharge Disposition: Home or Self Care     Discharge Diagnosis:     #Dehydration   #ANUPAMA  #Mild rhabdomyolysis   #Hyponatremia  #Hypocalcemia  #Leukocytosis      History of Present Illness: Dwayne Shane is a 24 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 Pt has been drinking ~ 7 L of fluid per day including gatorade and regular water. The patient comes to the hospital with severe LE cramping, malaise.      Brief Synopsis:   Patient presents with lower extremity cramping.  Found to have mild rhabdomyolysis with acute kidney injury.  He was admitted and received aggressive fluid hydration.  Patient's ANUPAMA resolved.  Patient was seen continued mild cramping but improved.  We discussed appropriate fluid hydration and solute intake during times of extreme exertion.  Patient encouraged to establish with a PCP for continued follow-up after discharge.                Latest Reference Range & Units 07/17/24 17:36 07/18/24 06:00   Glucose 70 - 99 mg/dL 83 101 (H)   Sodium 136 - 145 mmol/L 127 (L) 138   Potassium 3.5 - 5.1 mmol/L 3.9 4.1   Chloride 98 - 112 mmol/L 91 (L) 106   Carbon Dioxide, Total 21.0 - 32.0 mmol/L 20.0 (L) 26.0   BUN  9 - 23 mg/dL 25 (H) 19   CREATININE 0.70 - 1.30 mg/dL 2.69 (H) 1.04   CALCIUM 8.7 - 10.4 mg/dL 10.9 (H) 8.7   EGFR >=60 mL/min/1.73m2 33 (L) 103   CALCULATED OSMOLALITY 275 - 295 mOsm/kg 268 (L) 288   Total Bilirubin 0.3 - 1.2 mg/dL 1.3 (H)    CK  U/L U/L 1,285 (H) 1,384 (H)      Latest Reference Range & Units 07/17/24 17:36 07/18/24 06:00   WBC 4.0 - 11.0 x10(3) uL 16.1 (H) 9.2   Hemoglobin 13.0 - 17.5 g/dL 16.0 13.5   Hematocrit 39.0 - 53.0 % 44.8 38.3 (L)   Platelet Count 150.0 - 450.0 10(3)uL 326.0 252.0   Prelim Neutrophil Abs 1.50 - 7.70 x10 (3) uL 11.69 (H) 4.33   Neutrophils Absolute 1.50 - 7.70 x10(3) uL 11.69 (H) 4.33   (H): Data is abnormally high  (L): Data is abnormally low  MEDICATIONS ADMINISTERED IN LAST 1 DAY:          Vitals (last day) before discharge       Date/Time Temp Pulse Resp BP SpO2 Weight O2 Device O2 Flow Rate (L/min) [facility]    07/18/24 1106 -- 54 -- -- 96 % -- -- -- EP    07/18/24 0828 98 °F (36.7 °C) 62 17 109/54 97 % -- None (Room air) --     07/18/24 0449 98.2 °F (36.8 °C) 54 16 109/46 97 % -- None (Room air) -- AE    07/18/24 0111 -- 60 -- 117/52 97 % -- -- -- AE    07/18/24 0100 98.2 °F (36.8 °C) 59 16 -- 96 % -- -- -- AE    07/17/24 2226 -- -- -- -- -- 200 lb (90.7 kg) -- --     07/17/24 2204 98.1 °F (36.7 °C) 86 18 123/60 96 % -- None (Room air) -- AE    07/17/24 2104 -- 80 17 130/80 100 % -- None (Room air) -- DH    07/17/24 1724 97.5 °F (36.4 °C) 85 18 137/96 100 % 200 lb (90.7 kg) None (Room air) -- DH

## 2024-07-24 ENCOUNTER — OCC HEALTH (OUTPATIENT)
Dept: OTHER | Facility: HOSPITAL | Age: 24
End: 2024-07-24
Attending: PREVENTIVE MEDICINE

## 2024-07-24 DIAGNOSIS — Z02.89 VISIT FOR OCCUPATIONAL HEALTH EXAMINATION: Primary | ICD-10-CM

## 2024-07-24 LAB
ALBUMIN SERPL-MCNC: 4.7 G/DL (ref 3.2–4.8)
ALBUMIN/GLOB SERPL: 1.5 {RATIO} (ref 1–2)
ALP LIVER SERPL-CCNC: 62 U/L
ALT SERPL-CCNC: 40 U/L
ANION GAP SERPL CALC-SCNC: 3 MMOL/L (ref 0–18)
AST SERPL-CCNC: 22 U/L (ref ?–34)
BASOPHILS # BLD AUTO: 0.03 X10(3) UL (ref 0–0.2)
BASOPHILS NFR BLD AUTO: 0.3 %
BILIRUB SERPL-MCNC: 0.4 MG/DL (ref 0.3–1.2)
BILIRUB UR QL STRIP.AUTO: NEGATIVE
BUN BLD-MCNC: 15 MG/DL (ref 9–23)
CALCIUM BLD-MCNC: 9.7 MG/DL (ref 8.7–10.4)
CHLORIDE SERPL-SCNC: 107 MMOL/L (ref 98–112)
CK SERPL-CCNC: 206 U/L
CLARITY UR REFRACT.AUTO: CLEAR
CO2 SERPL-SCNC: 26 MMOL/L (ref 21–32)
CREAT BLD-MCNC: 1.09 MG/DL
EGFRCR SERPLBLD CKD-EPI 2021: 97 ML/MIN/1.73M2 (ref 60–?)
EOSINOPHIL # BLD AUTO: 0.08 X10(3) UL (ref 0–0.7)
EOSINOPHIL NFR BLD AUTO: 0.9 %
ERYTHROCYTE [DISTWIDTH] IN BLOOD BY AUTOMATED COUNT: 12.9 %
FASTING STATUS PATIENT QL REPORTED: NO
GLOBULIN PLAS-MCNC: 3.1 G/DL (ref 2.8–4.4)
GLUCOSE BLD-MCNC: 85 MG/DL (ref 70–99)
GLUCOSE UR STRIP.AUTO-MCNC: NORMAL MG/DL
HCT VFR BLD AUTO: 45.1 %
HGB BLD-MCNC: 15.2 G/DL
IMM GRANULOCYTES # BLD AUTO: 0.03 X10(3) UL (ref 0–1)
IMM GRANULOCYTES NFR BLD: 0.3 %
KETONES UR STRIP.AUTO-MCNC: NEGATIVE MG/DL
LEUKOCYTE ESTERASE UR QL STRIP.AUTO: NEGATIVE
LYMPHOCYTES # BLD AUTO: 3.34 X10(3) UL (ref 1–4)
LYMPHOCYTES NFR BLD AUTO: 36.4 %
MCH RBC QN AUTO: 28.7 PG (ref 26–34)
MCHC RBC AUTO-ENTMCNC: 33.7 G/DL (ref 31–37)
MCV RBC AUTO: 85.1 FL
MONOCYTES # BLD AUTO: 0.87 X10(3) UL (ref 0.1–1)
MONOCYTES NFR BLD AUTO: 9.5 %
NEUTROPHILS # BLD AUTO: 4.82 X10 (3) UL (ref 1.5–7.7)
NEUTROPHILS # BLD AUTO: 4.82 X10(3) UL (ref 1.5–7.7)
NEUTROPHILS NFR BLD AUTO: 52.6 %
NITRITE UR QL STRIP.AUTO: NEGATIVE
OSMOLALITY SERPL CALC.SUM OF ELEC: 282 MOSM/KG (ref 275–295)
PH UR STRIP.AUTO: 6 [PH] (ref 5–8)
PLATELET # BLD AUTO: 303 10(3)UL (ref 150–450)
POTASSIUM SERPL-SCNC: 4.2 MMOL/L (ref 3.5–5.1)
PROT SERPL-MCNC: 7.8 G/DL (ref 5.7–8.2)
PROT UR STRIP.AUTO-MCNC: NEGATIVE MG/DL
RBC # BLD AUTO: 5.3 X10(6)UL
RBC UR QL AUTO: NEGATIVE
SODIUM SERPL-SCNC: 136 MMOL/L (ref 136–145)
SP GR UR STRIP.AUTO: 1.02 (ref 1–1.03)
UROBILINOGEN UR STRIP.AUTO-MCNC: NORMAL MG/DL
WBC # BLD AUTO: 9.2 X10(3) UL (ref 4–11)

## 2024-07-24 PROCEDURE — 81003 URINALYSIS AUTO W/O SCOPE: CPT

## 2024-07-24 PROCEDURE — 82550 ASSAY OF CK (CPK): CPT

## 2024-07-24 PROCEDURE — 85025 COMPLETE CBC W/AUTO DIFF WBC: CPT

## 2024-07-24 PROCEDURE — 80053 COMPREHEN METABOLIC PANEL: CPT
